# Patient Record
Sex: FEMALE | Race: WHITE | NOT HISPANIC OR LATINO | URBAN - METROPOLITAN AREA
[De-identification: names, ages, dates, MRNs, and addresses within clinical notes are randomized per-mention and may not be internally consistent; named-entity substitution may affect disease eponyms.]

---

## 2018-01-10 ENCOUNTER — SURGERY - HEALTHEAST (OUTPATIENT)
Dept: SURGERY | Facility: CLINIC | Age: 38
End: 2018-01-10

## 2018-01-10 ENCOUNTER — ANESTHESIA - HEALTHEAST (OUTPATIENT)
Dept: SURGERY | Facility: CLINIC | Age: 38
End: 2018-01-10

## 2018-01-10 ASSESSMENT — MIFFLIN-ST. JEOR
SCORE: 1550.93
SCORE: 1526.83

## 2018-01-11 ASSESSMENT — MIFFLIN-ST. JEOR: SCORE: 1553.14

## 2018-01-18 ENCOUNTER — COMMUNICATION - HEALTHEAST (OUTPATIENT)
Dept: SURGERY | Facility: CLINIC | Age: 38
End: 2018-01-18

## 2018-01-30 ENCOUNTER — SURGERY - HEALTHEAST (OUTPATIENT)
Dept: SURGERY | Facility: HOSPITAL | Age: 38
End: 2018-01-30

## 2018-01-30 ENCOUNTER — ANESTHESIA - HEALTHEAST (OUTPATIENT)
Dept: SURGERY | Facility: HOSPITAL | Age: 38
End: 2018-01-30

## 2018-01-30 ASSESSMENT — MIFFLIN-ST. JEOR: SCORE: 1513.23

## 2021-05-31 VITALS — HEIGHT: 64 IN | BODY MASS INDEX: 33.43 KG/M2 | WEIGHT: 195.8 LBS

## 2021-05-31 VITALS — WEIGHT: 187 LBS | HEIGHT: 64 IN | BODY MASS INDEX: 31.92 KG/M2

## 2021-06-15 NOTE — ANESTHESIA PREPROCEDURE EVALUATION
Anesthesia Evaluation      Patient summary reviewed   No history of anesthetic complications     Airway   Mallampati: II  Neck ROM: full   Pulmonary - negative ROS and normal exam                          Cardiovascular - negative ROS and normal exam   Neuro/Psych    (+) anxiety/panic attacks,     Comments: Bipolar disorder    Endo/Other    (+) obesity (BMI 32),      GI/Hepatic/Renal - negative ROS   (-) GERD          Dental - normal exam                        Anesthesia Plan  Planned anesthetic: general endotracheal  The patient denies anaphylaxis to egg derivatives.  Scopolamine patch  Decadron 10 mg IV    ASA 2   Induction: intravenous   Anesthetic plan and risks discussed with: patient and spouse  Anesthesia plan special considerations: antiemetics,   Post-op plan: routine recovery

## 2021-06-15 NOTE — ANESTHESIA POSTPROCEDURE EVALUATION
Patient: Dina Martínez  CHOLECYSTECTOMY, LAPAROSCOPIC , CHOLANGIOGRAMS  Anesthesia type: general    Patient location: PACU  Last vitals:   Vitals:    01/30/18 1250   BP: 138/79   Pulse: 81   Resp: 15   Temp:    SpO2: 96%     Post vital signs: stable  Level of consciousness: awake and responds to simple questions  Post-anesthesia pain: pain controlled  Post-anesthesia nausea and vomiting: no  Pulmonary: unassisted, return to baseline  Cardiovascular: stable and blood pressure at baseline  Hydration: adequate  Anesthetic events: no    QCDR Measures:  ASA# 11 - Ingrid-op Cardiac Arrest: ASA11B - Patient did NOT experience unanticipated cardiac arrest  ASA# 12 - Ingrid-op Mortality Rate: ASA12B - Patient did NOT die  ASA# 13 - PACU Re-Intubation Rate: ASA13B - Patient did NOT require a new airway mgmt  ASA# 10 - Composite Anes Safety: ASA10A - No serious adverse event    Additional Notes:

## 2021-06-15 NOTE — ANESTHESIA CARE TRANSFER NOTE
Last vitals:   Vitals:    01/30/18 1140   BP: (!) 121/92   Pulse: 100   Resp: 16   Temp: 36.7  C (98.1  F)   SpO2: 100%     Patient's level of consciousness is awake and drowsy  Spontaneous respirations: yes  Maintains airway independently: yes  Dentition unchanged: yes  Oropharynx: oropharynx clear of all foreign objects    QCDR Measures:  ASA# 20 - Surgical Safety Checklist: WHO surgical safety checklist completed prior to induction  PQRS# 430 - Adult PONV Prevention: 4558F - Pt received => 2 anti-emetic agents (different classes) preop & intraop  ASA# 8 - Peds PONV Prevention: 4558F - Pt received => 2 anti-emetic agents (different classes) preop & intraop  PQRS# 424 - Ingrid-op Temp Management: 4559F - At least one body temp DOCUMENTED => 35.5C or 95.9F within required timeframe  PQRS# 426 - PACU Transfer Protocol: - Transfer of care checklist used  ASA# 14 - Acute Post-op Pain: ASA14B - Patient did NOT experience pain >= 7 out of 10

## 2021-06-15 NOTE — ANESTHESIA CARE TRANSFER NOTE
Last vitals:   Vitals:    01/10/18 2110   BP: 120/64   Pulse: (!) 124   Resp: 16   Temp: 37.2  C (99  F)   SpO2: 100%     Patient's level of consciousness is drowsy  Spontaneous respirations: yes  Maintains airway independently: yes  Dentition unchanged: yes  Oropharynx: oropharynx clear of all foreign objects    QCDR Measures:  ASA# 20 - Surgical Safety Checklist: WHO surgical safety checklist completed prior to induction  PQRS# 430 - Adult PONV Prevention: 4558F - Pt received => 2 anti-emetic agents (different classes) preop & intraop  ASA# 8 - Peds PONV Prevention: NA - Not pediatric patient, not GA or 2 or more risk factors NOT present  PQRS# 424 - Ingrid-op Temp Management: 4559F - At least one body temp DOCUMENTED => 35.5C or 95.9F within required timeframe  PQRS# 426 - PACU Transfer Protocol: - Transfer of care checklist used  ASA# 14 - Acute Post-op Pain: ASA14B - Patient did NOT experience pain >= 7 out of 10

## 2021-06-15 NOTE — ANESTHESIA POSTPROCEDURE EVALUATION
Patient: Dina Martínez  APPENDECTOMY, LAPAROSCOPIC  Anesthesia type: general    Patient location: PACU  Last vitals:   Vitals:    01/10/18 2130   BP: 120/72   Pulse: (!) 103   Resp: 12   Temp:    SpO2: 100%     Post vital signs: stable  Level of consciousness: awake and responds to simple questions  Post-anesthesia pain: pain controlled  Post-anesthesia nausea and vomiting: no  Pulmonary: unassisted, return to baseline  Cardiovascular: stable and blood pressure at baseline  Hydration: adequate  Anesthetic events: no    QCDR Measures:  ASA# 11 - Ingrid-op Cardiac Arrest: ASA11B - Patient did NOT experience unanticipated cardiac arrest  ASA# 12 - Ingrid-op Mortality Rate: ASA12B - Patient did NOT die  ASA# 13 - PACU Re-Intubation Rate: ASA13B - Patient did NOT require a new airway mgmt  ASA# 10 - Composite Anes Safety: ASA10A - No serious adverse event    Additional Notes:

## 2021-06-15 NOTE — ANESTHESIA PREPROCEDURE EVALUATION
Anesthesia Evaluation      Patient summary reviewed   No history of anesthetic complications     Airway   Mallampati: II  Neck ROM: full   Pulmonary - negative ROS and normal exam                          Cardiovascular   Exercise tolerance: > or = 4 METS  Rhythm: regular        Neuro/Psych    (+) anxiety/panic attacks,     Comments: Bipolar disorder      Endo/Other - negative ROS      GI/Hepatic/Renal       Other findings:     NPO 5 AM                  Acute appendicitis     Bipolar disorder    Generalized anxiety disorder                Results for TARSHA MOORE (MRN [id number]) as of 1/10/2018     1/10/2018 12:10  Sodium: 139  Potassium: 4.3  Chloride: 102  CO2: 25  Anion Gap, Calculation: 12  BUN: 7 (L)  Creatinine: 0.81  GFR MDRD Af Amer: >60  GFR MDRD Non Af Amer: >60  Calcium: 10.2  AST: 19  ALT: 16  ALBUMIN: 4.1  Protein, Total: 8.5 (H)  Alkaline Phosphatase: 104  Bilirubin, Total: 0.4  Glucose: 97  Lactic Acid: 1.0  WBC: 17.6 (H)  RBC: 5.39  Hemoglobin: 15.5  Hematocrit: 46.3  MCV: 86  MCH: 28.8  MCHC: 33.5  RDW: 12.5  Platelets: 372  MPV: 9.0  Neutrophils %: 89 (H)  Lymphocytes %: 6 (L)  Monocytes %: 5  Eosinophils %: 1  Basophils %: 1  Neutrophils Absolute: 15.5 (H)      Results for TARSHA MOORE (MRN [id number]) as of 1/10/2018    1/10/2018 13:27  Pregnancy Test, Urine: Negative          Dental - normal exam                        Anesthesia Plan  Planned anesthetic: general endotracheal      RSI  Glidescope  Surgeon would not like a TAP block for post-op pain          ASA 2 - emergent   Induction: intravenous   Anesthetic plan and risks discussed with: patient and spouse  Anesthesia plan special considerations: video-assisted, rapid sequence induction,   Post-op plan: routine recovery            Clover Peters MD  Staff Anesthesiologist  [facility]  1/10/18

## 2021-06-16 PROBLEM — K80.20 CHOLELITHIASIS: Status: ACTIVE | Noted: 2018-01-30

## 2021-06-16 PROBLEM — K35.80 ACUTE APPENDICITIS: Status: ACTIVE | Noted: 2018-01-10

## 2021-06-16 PROBLEM — R79.89 ELEVATED LFTS: Status: ACTIVE | Noted: 2018-01-30

## 2021-06-16 PROBLEM — K80.20 SYMPTOMATIC CHOLELITHIASIS: Status: ACTIVE | Noted: 2018-01-30

## 2021-06-26 ENCOUNTER — HEALTH MAINTENANCE LETTER (OUTPATIENT)
Age: 41
End: 2021-06-26

## 2021-07-03 NOTE — ADDENDUM NOTE
Addendum Note by Clover Peters MD at 1/10/2018  9:50 PM     Author: Clover Peters MD Service: -- Author Type: Physician    Filed: 1/10/2018  9:50 PM Date of Service: 1/10/2018  9:50 PM Status: Signed    : Clover Peters MD (Physician)       Addendum  created 01/10/18 2150 by Clover Peters MD    Sign clinical note

## 2021-10-11 ENCOUNTER — HEALTH MAINTENANCE LETTER (OUTPATIENT)
Age: 41
End: 2021-10-11

## 2022-07-17 ENCOUNTER — HEALTH MAINTENANCE LETTER (OUTPATIENT)
Age: 42
End: 2022-07-17

## 2022-09-25 ENCOUNTER — HEALTH MAINTENANCE LETTER (OUTPATIENT)
Age: 42
End: 2022-09-25

## 2023-08-05 ENCOUNTER — HEALTH MAINTENANCE LETTER (OUTPATIENT)
Age: 43
End: 2023-08-05

## 2024-03-02 ENCOUNTER — HEALTH MAINTENANCE LETTER (OUTPATIENT)
Age: 44
End: 2024-03-02